# Patient Record
Sex: FEMALE | Race: ASIAN | NOT HISPANIC OR LATINO
[De-identification: names, ages, dates, MRNs, and addresses within clinical notes are randomized per-mention and may not be internally consistent; named-entity substitution may affect disease eponyms.]

---

## 2018-05-08 ENCOUNTER — APPOINTMENT (OUTPATIENT)
Dept: INTERNAL MEDICINE | Facility: CLINIC | Age: 28
End: 2018-05-08
Payer: COMMERCIAL

## 2018-05-08 ENCOUNTER — NON-APPOINTMENT (OUTPATIENT)
Age: 28
End: 2018-05-08

## 2018-05-08 VITALS
WEIGHT: 104 LBS | OXYGEN SATURATION: 98 % | BODY MASS INDEX: 20.42 KG/M2 | HEART RATE: 91 BPM | RESPIRATION RATE: 18 BRPM | TEMPERATURE: 98.2 F | DIASTOLIC BLOOD PRESSURE: 77 MMHG | HEIGHT: 60 IN | SYSTOLIC BLOOD PRESSURE: 111 MMHG

## 2018-05-08 DIAGNOSIS — Z80.0 FAMILY HISTORY OF MALIGNANT NEOPLASM OF DIGESTIVE ORGANS: ICD-10-CM

## 2018-05-08 DIAGNOSIS — N60.02 SOLITARY CYST OF LEFT BREAST: ICD-10-CM

## 2018-05-08 DIAGNOSIS — R00.2 PALPITATIONS: ICD-10-CM

## 2018-05-08 DIAGNOSIS — Z83.3 FAMILY HISTORY OF DIABETES MELLITUS: ICD-10-CM

## 2018-05-08 DIAGNOSIS — Z80.9 FAMILY HISTORY OF MALIGNANT NEOPLASM, UNSPECIFIED: ICD-10-CM

## 2018-05-08 DIAGNOSIS — M79.674 PAIN IN RIGHT TOE(S): ICD-10-CM

## 2018-05-08 DIAGNOSIS — M25.561 PAIN IN RIGHT KNEE: ICD-10-CM

## 2018-05-08 DIAGNOSIS — Z82.49 FAMILY HISTORY OF ISCHEMIC HEART DISEASE AND OTHER DISEASES OF THE CIRCULATORY SYSTEM: ICD-10-CM

## 2018-05-08 DIAGNOSIS — H54.7 UNSPECIFIED VISUAL LOSS: ICD-10-CM

## 2018-05-08 DIAGNOSIS — F43.9 REACTION TO SEVERE STRESS, UNSPECIFIED: ICD-10-CM

## 2018-05-08 DIAGNOSIS — Z00.00 ENCOUNTER FOR GENERAL ADULT MEDICAL EXAMINATION W/OUT ABNORMAL FINDINGS: ICD-10-CM

## 2018-05-08 DIAGNOSIS — R92.2 INCONCLUSIVE MAMMOGRAM: ICD-10-CM

## 2018-05-08 DIAGNOSIS — Z23 ENCOUNTER FOR IMMUNIZATION: ICD-10-CM

## 2018-05-08 PROCEDURE — 93000 ELECTROCARDIOGRAM COMPLETE: CPT

## 2018-05-08 PROCEDURE — 99203 OFFICE O/P NEW LOW 30 MIN: CPT

## 2018-05-08 PROCEDURE — 99385 PREV VISIT NEW AGE 18-39: CPT

## 2018-05-08 RX ORDER — NORETHINDRONE ACETATE AND ETHINYL ESTRADIOL AND FERROUS FUMARATE 1.5-30(21)
KIT ORAL
Refills: 0 | Status: ACTIVE | COMMUNITY

## 2018-05-08 RX ORDER — HUMAN PAPILLOMAVIRUS QUADRIVALENT (TYPES 6, 11, 16, AND 18) VACCINE, RECOMBINANT 20; 40; 40; 20 UG/.5ML; UG/.5ML; UG/.5ML; UG/.5ML
INJECTION, SUSPENSION INTRAMUSCULAR
Qty: 1 | Refills: 0 | Status: ACTIVE | COMMUNITY
Start: 2018-05-08 | End: 1900-01-01

## 2018-07-19 ENCOUNTER — APPOINTMENT (OUTPATIENT)
Dept: CARDIOLOGY | Facility: CLINIC | Age: 28
End: 2018-07-19

## 2019-09-07 ENCOUNTER — EMERGENCY (EMERGENCY)
Facility: HOSPITAL | Age: 29
LOS: 1 days | Discharge: HOME | End: 2019-09-07
Payer: COMMERCIAL

## 2019-09-07 VITALS
OXYGEN SATURATION: 100 % | DIASTOLIC BLOOD PRESSURE: 77 MMHG | RESPIRATION RATE: 18 BRPM | WEIGHT: 104.94 LBS | TEMPERATURE: 98 F | HEART RATE: 112 BPM | SYSTOLIC BLOOD PRESSURE: 107 MMHG | HEIGHT: 68 IN

## 2019-09-07 VITALS
TEMPERATURE: 98 F | RESPIRATION RATE: 16 BRPM | DIASTOLIC BLOOD PRESSURE: 73 MMHG | SYSTOLIC BLOOD PRESSURE: 114 MMHG | HEART RATE: 90 BPM | OXYGEN SATURATION: 100 %

## 2019-09-07 LAB
APPEARANCE UR: CLEAR — SIGNIFICANT CHANGE UP
BACTERIA # UR AUTO: ABNORMAL
BILIRUB UR-MCNC: NEGATIVE — SIGNIFICANT CHANGE UP
COLOR SPEC: YELLOW — SIGNIFICANT CHANGE UP
DIFF PNL FLD: NEGATIVE — SIGNIFICANT CHANGE UP
EPI CELLS # UR: 3 /HPF — SIGNIFICANT CHANGE UP
GLUCOSE UR QL: NEGATIVE — SIGNIFICANT CHANGE UP
HYALINE CASTS # UR AUTO: 1 /LPF — SIGNIFICANT CHANGE UP (ref 0–2)
KETONES UR-MCNC: NEGATIVE — SIGNIFICANT CHANGE UP
LEUKOCYTE ESTERASE UR-ACNC: NEGATIVE — SIGNIFICANT CHANGE UP
NITRITE UR-MCNC: NEGATIVE — SIGNIFICANT CHANGE UP
PH UR: 6.5 — SIGNIFICANT CHANGE UP (ref 5–8)
PROT UR-MCNC: ABNORMAL
RBC CASTS # UR COMP ASSIST: 8 /HPF — HIGH (ref 0–4)
SP GR SPEC: 1.03 — HIGH (ref 1.01–1.02)
UROBILINOGEN FLD QL: NEGATIVE — SIGNIFICANT CHANGE UP
WBC UR QL: 2 /HPF — SIGNIFICANT CHANGE UP (ref 0–5)

## 2019-09-07 PROCEDURE — 81001 URINALYSIS AUTO W/SCOPE: CPT

## 2019-09-07 PROCEDURE — 99284 EMERGENCY DEPT VISIT MOD MDM: CPT

## 2019-09-07 NOTE — ED PROVIDER NOTE - PATIENT PORTAL LINK FT
You can access the FollowMyHealth Patient Portal offered by [facility] by registering at the following website: [email or web address]. By joining [employer]’s FollowMyHealth portal, you will also be able to view your health information using other applications (apps) compatible with our system.

## 2019-09-07 NOTE — ED PROVIDER NOTE - OBJECTIVE STATEMENT
28 yo F no PMHX p/w pelvic pain x 10 days. Pt reports pain has worsened in the past 2-3 days. LMP 2 weeks ago. No N/V. No urinary sx. No hx STDs. Pt with one male sexual partner.

## 2019-09-07 NOTE — ED ADULT NURSE NOTE - OBJECTIVE STATEMENT
29y f pt with significant other c/o pelvic pain x 1 week; LMP 8/21; last vag exam 8/16; 29y f pt with significant other c/o pelvic pain x 10 days; pt indicates RLQ; LMP 8/21; last vag exam 8/16; aox3; no resp distress; no chest pain; no abd pain; no n/v/d; no fever/chills; no cough/congestion; no dysuria/hematuria; no blood in stool; no constipation; pt states passes out when has ultrasound tests and pelvic exams; vss; safety/comfort maintained

## 2019-09-07 NOTE — ED PROVIDER NOTE - PROGRESS NOTE DETAILS
Patrick Hancock M.D. Resident: Pt offered TVUS, declined and would like to follow up with OB on Monday, denies pain at this time, abd S/NT, pelvic exam without adnexal tenderness or cervical motion tenderness

## 2019-09-07 NOTE — ED PROVIDER NOTE - ATTENDING CONTRIBUTION TO CARE
Patient:   EFREN KHANNA            MRN: [id number]            FIN: [id number]              Age:   31 years     Sex:  FEMALE     :  89   Associated Diagnoses:   Vulvar abscess   Author:   ERICK PAREDES     Postoperative Information     Postoperative Information   Postoperative Information:          Preoperative Diagnosis: Vulvar abscess (LDR18-MU N76.4, Diagnosis, Hospitalized, Medical).         Postoperative Diagnosis: Same As Pre-Op Dx.         Performed by:    ERICK PAREDES (Surgeon - Primary)  .         Assistant: None,    .         Surgical Tasks Performed by Assistant: None.         Procedures Performed: 1.  EUA  2.  I & D of vulvar abscess.         Findings: Left vulvar abscess completely drainage with good hemostasis.         Specimens Removed: Left vulvar abscess.  Anerobic and aerobic culture pending..         Estimated Blood Loss: 10  ml.         Blood Administered: No.         Complications: None.         Grafts/Implants: None.    Anesthetic utilized:  Monitored anesthesia care,    SARA VARGAS, RENU WHITE (Supervisor)  .   MD Ponce:  patient seen and evaluated with the resident.  I was present for key portions of the History & Physical, and I agree with the Impression & Plan.  MD Ponce:  30 yo F, c/o 10d R-sided pelvic pain.  Associated Sx: no N/V/D, no F/C, no VB or VD.  Patient is in the ED because she called her primary OB/GYN, who told her to get evaluated in the ED if her pain was that severe.  Patient rates her pain 5/10.  She is not pregnant by ED UCG.   Initial tachycardia of 113 spontaneously resolved w/o any ED intervention.   Physical Exam: adult F, NAD, NCAT, PERRL, EOMI, neck supple, RRR, CTA B, Abd: s/nd/nt (zero TTP, and zero flank pain), Ext: no edema, Neuro: AAOx3, ambulates w/o diff, strength 5/5 & symmetric throughout.  Impression/Plan:  atypical R-sided abdominal pain in otherwise healthy non-pregnant adult F w/o clinical or physical exam evidence of appendicitis or torsion.  There is no abd pain on external exam, she is afebrile, and no GI symptoms after 10d of pain.  Suspicion for torsion low, given that she has no pain on pelvic exam.  While a cyst is possible, she does not has severe pain, she is VERY well appearing, and can safely follow up as an outpatient for further evaluation of this condition.  Strict return precautions (worsening pain, f/c, GI symptoms, vaginal bleeding, &/or vaginal discharge).

## 2019-09-07 NOTE — ED PROVIDER NOTE - PHYSICAL EXAMINATION
Gen: AAOx3, non-toxic  Head: NCAT  HEENT: EOMI, oral mucosa moist, normal conjunctiva  Lung: CTAB, no respiratory distress, no wheezes/rhonchi/rales B/L, speaking in full sentences  CV: RRR, no murmurs, rubs or gallops  Abd: soft, NTND, no guarding  GYN (pelvic exam chaperoned by Vanessa - [facility]) no adnexal tenderness or CM tenderness  MSK: no visible deformities  Neuro: No focal sensory or motor deficits  Skin: Warm, well perfused, no rash  Psych: normal affect.   ~Patrick Hancock M.D. Resident

## 2019-09-07 NOTE — ED PROVIDER NOTE - CLINICAL SUMMARY MEDICAL DECISION MAKING FREE TEXT BOX
pelvic pain x 10 days, exam unremarkable, pt well appearing, no vaginal bleeding, will check upreg, UA, DC with OB/GYN follow up

## 2019-09-07 NOTE — ED ADULT NURSE NOTE - NSIMPLEMENTINTERV_GEN_ALL_ED
Implemented All Universal Safety Interventions:  [place] to call system. Call bell, personal items and telephone within reach. Instruct patient to call for assistance. Room bathroom lighting operational. Non-slip footwear when patient is off stretcher. Physically safe environment: no spills, clutter or unnecessary equipment. Stretcher in lowest position, wheels locked, appropriate side rails in place.

## 2019-09-07 NOTE — ED PROVIDER NOTE - NS ED ROS FT
GENERAL: No fever or chills, EYES: no change in vision, HEENT: no trouble swallowing or speaking, CARDIAC: no chest pain, PULMONARY: no cough or SOB, GI: +abdominal pain, no nausea, no vomiting, no diarrhea or constipation, : No changes in urination, SKIN: no rashes, NEURO: no headache,  MSK: No joint pain ~Patrick Hancock M.D. Resident

## 2019-09-07 NOTE — ED PROVIDER NOTE - NSFOLLOWUPINSTRUCTIONS_ED_ALL_ED_FT
You were seen in the Emergency Department for pelvic pain.  1) Advance activity as tolerated.    2) Continue all previously prescribed medications as directed.  You may take 600 mg Motrin every 6 hours as needed for pain.  3) Follow up with your OB/GYN next week - take copies of your results.    4) Return to the Emergency Department for worsening or persistent symptoms, and/or ANY NEW OR CONCERNING SYMPTOMS. If you have issues obtaining follow up, please call: [phone] to obtain a doctor or specialist who takes your insurance in your area.

## 2019-09-07 NOTE — ED ADULT NURSE NOTE - CADM POA URETHRAL CATHETER
41F with PMHx of dermatomyositis, cryptogenic organizing pneumonia/BOOP with recent admission in December for cough, discharged on MMP, AZA and prednisone. She states by approximately 2 weeks after he discharge her cough had resolved and muscle aches were at her baseline. She was compliant with meds, and followed up with rheumatology 1/29. She had maintained at her baseline, except for redeveloping mild cough productive of clear sputumabout 2 weeks ago, so she was advised to stop her prednisone. Her typical rash on chest and face started to come back 1 week ago, which was associated with a burning sensation, and a few days prior to that she began experiencing progressive muscle aches and weakness. Prior to coming into the hospital her pain was 10/10 diffusely but mostly in shoulders, hips, forearms and hands, and she needed assistance to stand and was unable to raise arms above shoulder level. She denies fevers, chills, change in the color of her urine, dysuria, urinary frequency, hemoptysis, green colored sputu  at about the same time 41F with PMHx of dermatomyositis, cryptogenic organizing pneumonia/BOOP with recent admission in December for cough, discharged on MMP, AZA and prednisone. She states by approximately 2 weeks after he discharge her cough had resolved and muscle aches were at her baseline. She was compliant with meds, and followed up with rheumatology 1/29. She began developing a mild cough 2 weeks, however her muscle aches and weakness were at baseine so she was advised to stop prednisone. Her cough progressed, with paroxysmal coughing ever few hours, and several days later she began developing progressive muscle aches and weakness. Additionally, she redeveloped her typical rash on chest and face, which is associated with a burning sensation and dryness. Prior to coming into the hospital her pain was 10/10 diffusely but mostly in shoulders, hips, forearms and hands, and she needed assistance to stand and was unable to raise arms above shoulder level. She denies fevers, chills, change in the color of her urine, dysuria, urinary frequency, hemoptysis, green colored sputum.      In ED, afebrile, , /80, [place] [address], , ALT 94, Cr at baseline, no leukocytosis, started on Solumedrol 125mg and high rate of NS. No 41F with PMHx of dermatomyositis, cryptogenic organizing pneumonia/BOOP with recent admission in December for cough, discharged on MMP, AZA and prednisone. She states by approximately 2 weeks after he discharge her cough had resolved and muscle aches were at her baseline. She was compliant with meds, and followed up with rheumatology 1/29. She began developing a mild cough 2 weeks, however her muscle aches and weakness were at baseine so she was advised to stop prednisone. Her cough progressed, with paroxysmal coughing ever few hours, and several days later she began developing progressive muscle aches and weakness. Additionally, she redeveloped her typical rash on chest and face, which is associated with a burning sensation and dryness. Prior to coming into the hospital her pain was 10/10 diffusely but mostly in shoulders, hips, forearms and hands, and she needed assistance to stand and was unable to raise arms above shoulder level. She denies fevers, chills, change in the color of her urine, dysuria, urinary frequency, hemoptysis, green colored sputum.      In ED, afebrile, , /60, saturating [place] [address], , ALT 94, Cr at baseline, no leukocytosis, started on Solumedrol 125mg and high rate of NS.

## 2020-02-27 ENCOUNTER — RESULT REVIEW (OUTPATIENT)
Age: 30
End: 2020-02-27

## 2020-09-20 ENCOUNTER — EMERGENCY (EMERGENCY)
Facility: HOSPITAL | Age: 30
LOS: 1 days | Discharge: HOME | End: 2020-09-20
Attending: EMERGENCY MEDICINE
Payer: COMMERCIAL

## 2020-09-20 VITALS
TEMPERATURE: 98 F | WEIGHT: 104.06 LBS | HEART RATE: 93 BPM | RESPIRATION RATE: 16 BRPM | SYSTOLIC BLOOD PRESSURE: 127 MMHG | HEIGHT: 60 IN | DIASTOLIC BLOOD PRESSURE: 73 MMHG | OXYGEN SATURATION: 99 %

## 2020-09-20 PROCEDURE — 99283 EMERGENCY DEPT VISIT LOW MDM: CPT | Mod: 25

## 2020-09-20 PROCEDURE — 99283 EMERGENCY DEPT VISIT LOW MDM: CPT

## 2020-09-20 PROCEDURE — 73060 X-RAY EXAM OF HUMERUS: CPT

## 2020-09-20 PROCEDURE — 73060 X-RAY EXAM OF HUMERUS: CPT | Mod: 26,LT

## 2020-09-20 RX ORDER — IBUPROFEN 200 MG
600 TABLET ORAL ONCE
Refills: 0 | Status: COMPLETED | OUTPATIENT
Start: 2020-09-20 | End: 2020-09-20

## 2020-09-20 RX ADMIN — Medication 600 MILLIGRAM(S): at 19:54
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
## 2020-09-20 NOTE — ED PROVIDER NOTE - SOCIAL CONCERNS
Takes metformin 500 mg BID and insulin glargine 25 units HS at home. Hemoglobin A1c 15%. Giving insulin detemir 15 units am 25 units HS, insulin aspart 8 units TID with meals, moderate dose insulin sliding scale.      None

## 2020-09-20 NOTE — ED PROVIDER NOTE - PATIENT PORTAL LINK FT
You can access the FollowMyHealth Patient Portal offered by [facility] by registering at the following website: [email or web address]. By joining [employer]’s FollowMyHealth portal, you will also be able to view your health information using other applications (apps) compatible with our system.

## 2020-09-20 NOTE — ED PROVIDER NOTE - NEURO NEGATIVE STATEMENT, MLM
no loss of consciousness, no gait abnormality, no headache, no sensory deficits, and no weakness. no
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
## 2020-09-21 PROBLEM — Z78.9 OTHER SPECIFIED HEALTH STATUS: Chronic | Status: ACTIVE | Noted: 2019-09-07

## 2021-05-22 ENCOUNTER — EMERGENCY (EMERGENCY)
Facility: HOSPITAL | Age: 31
LOS: 1 days | Discharge: HOME | End: 2021-05-22
Attending: STUDENT IN AN ORGANIZED HEALTH CARE EDUCATION/TRAINING PROGRAM
Payer: COMMERCIAL

## 2021-05-22 VITALS
SYSTOLIC BLOOD PRESSURE: 100 MMHG | OXYGEN SATURATION: 98 % | DIASTOLIC BLOOD PRESSURE: 64 MMHG | HEART RATE: 89 BPM | TEMPERATURE: 99 F | RESPIRATION RATE: 16 BRPM

## 2021-05-22 VITALS
RESPIRATION RATE: 20 BRPM | OXYGEN SATURATION: 97 % | TEMPERATURE: 98 F | HEIGHT: 60 IN | HEART RATE: 136 BPM | WEIGHT: 104.94 LBS | DIASTOLIC BLOOD PRESSURE: 78 MMHG | SYSTOLIC BLOOD PRESSURE: 107 MMHG

## 2021-05-22 PROBLEM — Z78.9 OTHER SPECIFIED HEALTH STATUS: Chronic | Status: ACTIVE | Noted: 2020-09-20

## 2021-05-22 PROCEDURE — 99284 EMERGENCY DEPT VISIT MOD MDM: CPT

## 2021-05-22 PROCEDURE — 99283 EMERGENCY DEPT VISIT LOW MDM: CPT

## 2021-05-22 RX ORDER — FAMOTIDINE 10 MG/ML
20 INJECTION INTRAVENOUS ONCE
Refills: 0 | Status: COMPLETED | OUTPATIENT
Start: 2021-05-22 | End: 2021-05-22

## 2021-05-22 RX ORDER — FAMOTIDINE 10 MG/ML
1 INJECTION INTRAVENOUS
Qty: 8 | Refills: 0
Start: 2021-05-22 | End: 2021-05-25

## 2021-05-22 RX ORDER — FAMOTIDINE 10 MG/ML
20 INJECTION INTRAVENOUS DAILY
Refills: 0 | Status: DISCONTINUED | OUTPATIENT
Start: 2021-05-22 | End: 2021-05-22

## 2021-05-22 RX ORDER — DIPHENHYDRAMINE HCL 50 MG
25 CAPSULE ORAL ONCE
Refills: 0 | Status: COMPLETED | OUTPATIENT
Start: 2021-05-22 | End: 2021-05-22

## 2021-05-22 RX ORDER — EPINEPHRINE 0.3 MG/.3ML
0.3 INJECTION INTRAMUSCULAR; SUBCUTANEOUS
Qty: 1 | Refills: 0
Start: 2021-05-22

## 2021-05-22 RX ORDER — DIPHENHYDRAMINE HCL 50 MG
1 CAPSULE ORAL
Qty: 20 | Refills: 0
Start: 2021-05-22

## 2021-05-22 RX ORDER — DEXAMETHASONE 0.5 MG/5ML
12 ELIXIR ORAL ONCE
Refills: 0 | Status: COMPLETED | OUTPATIENT
Start: 2021-05-22 | End: 2021-05-22

## 2021-05-22 RX ADMIN — Medication 25 MILLIGRAM(S): at 06:09

## 2021-05-22 RX ADMIN — Medication 12 MILLIGRAM(S): at 06:08

## 2021-05-22 RX ADMIN — FAMOTIDINE 20 MILLIGRAM(S): 10 INJECTION INTRAVENOUS at 06:31

## 2021-05-22 NOTE — ED PROVIDER NOTE - CLINICAL SUMMARY MEDICAL DECISION MAKING FREE TEXT BOX
Pt p/w diffuse rash with COVID vaccine today. Some symptoms concerning for anaphylaxis that have resolved PTA to the ED and exam not concerning for anaphylaxis nor airway swelling. Will give benadryl, dex, and famotidine and observe in the ED for 4 hours. Will sign out to incoming doc pending completion of observation. Rx sent for epipen. Pt stable.

## 2021-05-22 NOTE — ED ADULT NURSE NOTE - OBJECTIVE STATEMENT
Patient presented to the ED with c/o allergic reaction to the 2nd dose of Pfizer vaccine. Patient presented with c/o generalized rash and swelling to face. Denies any difficulty breathing/swallowing.

## 2021-05-22 NOTE — ED PROVIDER NOTE - PATIENT PORTAL LINK FT
You can access the FollowMyHealth Patient Portal offered by [facility] by registering at the following website: [email or web address]. By joining [employer]’s FollowMyHealth portal, you will also be able to view your health information using other applications (apps) compatible with our system.

## 2021-05-22 NOTE — ED PROVIDER NOTE - PROGRESS NOTE DETAILS
WALLY: Patient signed out to me by Dr. Welch with plan to observe for total of 4 hours. Patient is resting comfortably, NAD.

## 2021-05-22 NOTE — ED PROVIDER NOTE - NSFOLLOWUPINSTRUCTIONS_ED_ALL_ED_FT
Allergies    WHAT YOU NEED TO KNOW:    Allergies are an immune system reaction to a substance called an allergen. Your immune system sees the allergen as harmful and attacks it. An allergic reaction can be mild or life-threatening. A life-threatening reaction is called anaphylaxis. Anaphylaxis is a sudden, life-threatening reaction that needs immediate treatment.    DISCHARGE INSTRUCTIONS:    Call 911 for signs or symptoms of anaphylaxis, such as trouble breathing, swelling in your mouth or throat, or wheezing. You may also have itching, a rash, hives, or feel like you are going to faint.    Return to the emergency department if:   •You have tingling in your hands or feet.       •Your skin is red or flushed.       Contact your healthcare provider if:   •You have questions or concerns about your condition or care.          Medicines:   •Antihistamines help decrease itching, sneezing, and swelling. You may take them as a pill or use drops in your nose or eyes.       •Decongestants help your nose feel less stuffy.       •Topical treatments help decrease itching or swelling. You also may be given nasal sprays or eyedrops.       •Epinephrine is used to treat a severe allergic reaction such as anaphylaxis.       •Take your medicine as directed. Contact your healthcare provider if you think your medicine is not helping or if you have side effects. Tell him of her if you are allergic to any medicine. Keep a list of the medicines, vitamins, and herbs you take. Include the amounts, and when and why you take them. Bring the list or the pill bottles to follow-up visits. Carry your medicine list with you in case of an emergency.      Steps to take for signs or symptoms of anaphylaxis:   •Immediately give 1 shot of epinephrine only into the outer thigh muscle.       •Leave the shot in place as directed. Your healthcare provider may recommend you leave it in place for up to 10 seconds before you remove it. This helps make sure all of the epinephrine is delivered.       •Call 911 and go to the emergency department, even if the shot improved symptoms. Do not drive yourself. Bring the used epinephrine shot with you.       Safety precautions to take if you are at risk for anaphylaxis:   •Keep 2 shots of epinephrine with you at all times. You may need a second shot, because epinephrine only works for about 20 minutes and symptoms may return. Your healthcare provider can show you and family members how to give the shot. Check the expiration date every month and replace it before it expires.      •Create an action plan. Your healthcare provider can help you create a written plan that explains the allergy and an emergency plan to treat a reaction. The plan explains when to give a second epinephrine shot if symptoms return or do not improve after the first. Give copies of the action plan and emergency instructions to family members and work staff. Show them how to give a shot of epinephrine.      •Be careful when you exercise. If you have had exercise-induced anaphylaxis, do not exercise right after you eat. Stop exercising right away if you start to develop any signs or symptoms of anaphylaxis. You may first feel tired, warm, or have itchy skin. Hives, swelling, and severe breathing problems may develop if you continue to exercise.      •Carry medical alert identification. Wear medical alert jewelry or carry a card that explains the allergy. Ask your healthcare provider where to get these items.   Medical Alert Jewelry           •Inform all healthcare providers of the allergy. This includes dentists, nurses, doctors, and surgeons.       Manage allergies:   •Use nasal rinses as directed. Rinse with a saline solution daily. This will help clear allergens out of your nose. Use distilled water if possible. You can also boil tap water and let it cool before you use it. Do not use tap water that has not been boiled.      •Do not smoke. Allergy symptoms may decrease if you are not around smoke. Nicotine and other chemicals in cigarettes and cigars can cause lung damage. Ask your healthcare provider for information if you currently smoke and need help to quit. E-cigarettes or smokeless tobacco still contain nicotine. Talk to your healthcare provider before you use these products.       Prevent allergic reactions:   •Do not go outside when pollen counts are high if you have seasonal allergies. Your symptoms may be better if you go outside only in the morning or evening. Use your air conditioner, and change air filters often.       •Avoid dust, fur, and mold. Dust and vacuum your home often. You may want to wear a mask when you vacuum. Keep pets in certain rooms, and bathe them often. Use a dehumidifier (machine that decreases moisture) to help prevent mold.       •Do not use products that contain latex if you have a latex allergy. Use nonlatex gloves if you work in healthcare or in food preparation. Always tell healthcare providers about a latex allergy.       •Avoid areas that attract insects if you have an insect bite or sting allergy. Areas include trash cans, gardens, and picnics. Do not wear bright clothing or strong scents when you will be outside.      •Prevent an allergic reaction caused by food. You may have a reaction if your food is not prepared safely. For example, you could be served food that touched your trigger food during preparation. This is called cross-contamination. Kitchen tools can also cause cross-contamination. You may also eat baked foods that contain a trigger food you do not know about. Ask if the food contains your trigger food before you handle or eat it.      Follow up with your healthcare provider as directed: Write down your questions so you remember to ask them during your visits. When you have an allergic reaction, write down everything you were exposed to in the 2 hours before the reaction. Take that information to your next visit.       © Copyright [employer] 2021           back to top                          © Copyright [employer] 2021

## 2021-05-22 NOTE — ED PROVIDER NOTE - OBJECTIVE STATEMENT
32 yo F h/o environmental allergies, no anaphylaxis, endometriosis p/w diffuse hives involving bilat arms and legs, torso, and back after receiving the second dose of pfizer covid vaccine. Pt states that she frequently gets hives 2/2 a number of allergies including pollen/dust, heat, and other environmental allergies and at 4pm had mild amount of hives on bilat forearms but was feeling well. Pt then received vaccine at 6pm and then shortly thereafter developed this diffuse rash. Pt states that she also felt nauseous but did not vomit and felt that her throat was irritated but had no SOB/wheezing/vomiting/ABD pain. Pt took 25 mg of benadryl with some improvement in the rash and then came to the ER. Pt denies recent fever, dysuria or frequency/hesitancy, chest pain, focal numbness/weakness, syncope, other recent illness or hospitalizations.

## 2021-05-22 NOTE — ED PROVIDER NOTE - NS ED ROS FT
Patient Reports No  Fever/Chills  Vomiting/Diarrhea  Urinary Symptoms  Chest Pain, Shortness of Breath  Syncope, Focal Numbness or Weakness  Other Recent Illness or Hospitalizations

## 2021-05-22 NOTE — ED ADULT TRIAGE NOTE - CHIEF COMPLAINT QUOTE
4pm yesterday started getting rash on my arms ,6pm,got my 2nd dose of pfeizer covid vaccine  and the rash got worse  took benadryl at 11pm, 3am I woke with face swelling, no sob

## 2021-09-17 NOTE — ED PROVIDER NOTE - PHYSICAL EXAMINATION
Vital Signs Reviewed  GEN: Comfortable, NAD, AAOx3  HEENT: Nml tongue and lips, Nml oropharynx, NCAT, MMM, Neck Supple  RESP: CTAB, No rales/rhonchi/wheezing  CV: RRR, S1S2, No murmurs  ABD: No TTP, Soft, ND, No masses, No CVA Tenderness  Extrem/Skin: Diffuse erythema with few raised lesions on bilat arms and legs, torso, and back, Equal pulses bilat, No cyanosis/edema  Neuro: No focal deficits No

## 2021-10-28 NOTE — ED ADULT NURSE NOTE - NSFALLRSKOUTCOME_ED_ALL_ED
83 yo F PMHX HTN, Glaucoma, HLD presented with one day history of SOB and wheezing.  Patient states she was in usual state of health until 10/26, when she started to feel wheezing after breakfast. She made her  breakfast and then played bridge from 1-3, but after bridge game, she could not catch her breath. Came to ED for further evaluation. Universal Safety Interventions

## 2022-05-03 NOTE — ED PROVIDER NOTE - PSYCHIATRIC LEVEL OF CONSCIOUSNESS
Discharge planning issues Discharge planning issues Discharge planning issues Discharge planning issues alert

## 2022-05-09 NOTE — ED ADULT NURSE NOTE - LATERALITY
----- Message from Bel Plummer MD sent at 5/9/2022  1:02 PM EDT -----  Cholesterol and blood test are good. left

## 2022-11-30 NOTE — ED ADULT NURSE NOTE - CAS DISCH TRANSFER METHOD
December 8, 2022     Philip Regalado, [address] [facility],[address]  [address] [place] [address]    Patient: Lanre Chandra   YOB: 1952   Date of Visit: 11/30/2022       Dear Dr Bernabe Salcedo: Thank you for referring Pamela Taylor to me for evaluation  Below are my notes for this consultation  If you have questions, please do not hesitate to call me  I look forward to following your patient along with you  Sincerely,        Garret Valle MD        CC: Kennedi Duran, MD Allen Felton, MD Garret Guerrero MD  12/8/2022 12:39 AM  Sign when Signing Visit  [address]  [phone]  [phone]     Date of Visit: 11/30/2022  Name: Lanre Chandra   YOB: 1952        Subjective    VISIT DIAGNOSIS:  Diagnoses and all orders for this visit:    Malignant melanoma of leg, right ([facility] [address] )  -     CBC and differential; Future  -     Comprehensive metabolic panel; Future  -     LD,Blood; Future  -     ECG 12 lead; Future    High risk medication use  -     CBC and differential; Future  -     Comprehensive metabolic panel; Future  -     LD,Blood; Future  -     ECG 12 lead; Future        Oncology History   Malignant melanoma of leg, right ([facility] [address] )   5/31/2022 Biopsy    Right Leg, Shave Biopsy   Melanoma extending to the deep specimen margin  Thickness: 7 0mm  Ulceration: not seen   Mitoses: 3      5/31/2022 -  Cancer Staged    Staging form: Melanoma of the Skin, AJCC 8th Edition  - Clinical stage from 5/31/2022: Stage IIB (cT4a, cN0, cM0) - Signed by Garret Valle MD on 8/25/2022 7/1/2022 Initial Diagnosis    Malignant melanoma of leg, right ([facility] [address] )     7/29/2022 Surgery    A  Lymph node, sentinel, #1:  One lymph node with rare metastatic melanoma cells (1/1), subcapsular location  Immunohistochemical study for MART-1, HMB45 and S100 supports the findings           B  Leg, right, knee, wide excision:  Residual melanoma, nodular type, and scar; margins free  See synoptic report  7/29/2022 -  Cancer Staged    Staging form: Melanoma of the Skin, AJCC 8th Edition  - Pathologic stage from 7/29/2022: Stage IIIC (pT4a, pN1a, cM0) - Signed by Tawana Renee MD on 8/25/2022          Cancer Staging   Malignant melanoma of leg, right [facility])  Staging form: Melanoma of the Skin, AJCC 8th Edition  - Clinical stage from 5/31/2022: Stage IIB (cT4a, cN0, cM0) - Signed by Tawana Renee MD on 8/25/2022  - Pathologic stage from 7/29/2022: Stage IIIC (pT4a, pN1a, cM0) - Signed by Tawana Renee MD on 8/25/2022     Treatment Details   Treatment goal [No plan goal]   Plan Name ONE-TIME BLOOD PRODUCT TRANSFUSION PLAN   Status Active   Start Date 7/15/2022   End Date Until discontinued   Provider VISHNU Stahl   Chemotherapy [No matching medication found in this treatment plan]     Treatment Details   Treatment goal Palliative   Plan Name OP RiTUXimab weekly x 4, every 6 months   Status Active   Start Date 8/1/2022   End Date 8/23/2022   Provider Lawson Zuniga DO   Chemotherapy riTUXimab (RITUXAN) subsequent titrated chemo infusion, 375 mg/m2 = 746 2 mg, Intravenous, Once, 1 of 1 cycle  Administration: 746 2 mg (8/9/2022), 746 2 mg (8/16/2022), 746 2 mg (8/23/2022)    riTUXimab (RITUXAN) first titrated chemo infusion, 375 mg/m2 = 746 2 mg, Intravenous, Once, 1 of 1 cycle  Administration: 746 2 mg (8/1/2022)          HISTORY OF PRESENT ILLNESS: Ascencion Shaw is a 78 y o  male  who is a history of autoimmune hemolytic anemia requiring treatment with stage IIIc 4 after initiating treatment with adjuvant encorafenib and binimetinib  Started treatment on 11/15/2022  He did call in on 11/17/2022 stating that he developed nausea, dizziness and headaches  He did state the dizziness was constant making it hard to get up and down the steps  He has been eating while taking his medications    We did send him in nausea medication  He has been doing better on the medication and has no complaints in the office today  He does state he can handle the way he feels  Dizziness has subsided  Denies any additional side effects associated with targeted therapy  He denies rash, itching, migratory arthralgias, swelling, chest pain, shortness of breath, and diarrhea  Get an EKG which was unchanged from prior  No significant changes in QTC  REVIEW OF SYSTEMS:  Review of Systems   Constitutional: Negative for appetite change, fatigue, fever and unexpected weight change  HENT:   Negative for lump/mass  Eyes: Negative for icterus  Respiratory: Negative for cough, shortness of breath and wheezing  Cardiovascular: Negative for leg swelling  Gastrointestinal: Positive for nausea  Negative for abdominal pain, constipation, diarrhea and vomiting  Genitourinary: Negative for difficulty urinating and hematuria  Musculoskeletal: Negative for arthralgias, gait problem and myalgias  Skin: Negative for itching and rash  No new, changing, or concerning lesions  Neurological: Positive for dizziness (resolved) and headaches (resolved)  Negative for extremity weakness, gait problem, light-headedness and numbness  Hematological: Negative for adenopathy          MEDICATIONS:    Current Outpatient Medications:   •  acetaminophen (TYLENOL) 325 mg tablet, Take 2 tablets (650 mg total) by mouth every 6 (six) hours as needed for mild pain, Disp:  , Rfl: 0  •  ALPRAZolam (XANAX) 0 5 mg tablet, Take 1 tablet (0 5 mg total) by mouth 2 (two) times a day Take one two hours before scan and second one 30 min before, Disp: 2 tablet, Rfl: 0  •  benzonatate (TESSALON PERLES) 100 mg capsule, Take 1 capsule (100 mg total) by mouth 3 (three) times a day, Disp: 20 capsule, Rfl: 0  •  binimetinib (MEKTOVI) 15 MG tablet, Take 3 tablets (45 mg total) by mouth every 12 (twelve) hours, Disp: 180 tablet, Rfl: 5  • dabigatran etexilate (PRADAXA) 150 mg capsu, Take 1 capsule (150 mg total) by mouth every 12 (twelve) hours, Disp: 60 capsule, Rfl: 0  •  encorafenib (BRAFTOVI) 75 MG capsule, Take 6 capsules (450 mg total) by mouth daily, Disp: 180 capsule, Rfl: 5  •  furosemide (LASIX) 20 mg tablet, Take 1 tablet (20 mg total) by mouth daily, Disp: 5 tablet, Rfl: 0  •  hydrochlorothiazide (HYDRODIURIL) 25 mg tablet, Take 1 tablet (25 mg total) by mouth daily (Patient taking differently: Take 25 mg by mouth every morning), Disp: 30 tablet, Rfl: 5  •  metoprolol succinate (TOPROL-XL) 25 mg 24 hr tablet, Take 0 5 tablets (12 5 mg total) by mouth daily (Patient taking differently: Take 12 5 mg by mouth every morning), Disp: 30 tablet, Rfl: 5  •  ondansetron (ZOFRAN) 4 mg tablet, Take 1 tablet (4 mg total) by mouth every 8 (eight) hours as needed for nausea or vomiting, Disp: 20 tablet, Rfl: 0  •  pantoprazole (PROTONIX) 40 mg tablet, Take 1 tablet (40 mg total) by mouth daily (Patient taking differently: Take 40 mg by mouth every morning), Disp: 90 tablet, Rfl: 3  •  potassium chloride (K-DUR,KLOR-CON) 20 mEq tablet, Take 1 tablet (20 mEq total) by mouth daily (Patient taking differently: Take 20 mEq by mouth every morning), Disp: 30 tablet, Rfl: 3  •  prazosin (MINIPRESS) 1 mg capsule, Take 1 mg by mouth daily at bedtime , Disp: , Rfl:   •  predniSONE 20 mg tablet, Take 2 tablets (40 mg total) by mouth daily, Disp: 60 tablet, Rfl: 0  •  sodium chloride, PF, 0 9 %, 10 mL by Intracatheter route daily Intracatheter flushing daily   May substitute prefilled syringe with normal saline 10 mL vials, 10 mL syringes, and 18 g blunt needles, Disp: 300 mL, Rfl: 0  •  sulfamethoxazole-trimethoprim (BACTRIM DS) 800-160 mg per tablet, Take 1 tablet by mouth 3 (three) times a week Monday, Wednesday and Friday, Disp: 12 tablet, Rfl: 0  •  VITAMIN D PO, Take 1,000 Units by mouth daily , Disp: , Rfl:   •  ascorbic acid (VITAMIN C) 1000 MG tablet, Take 1 tablet (1,000 mg total) by mouth every 12 (twelve) hours for 6 doses (Patient taking differently: Take 1,000 mg by mouth daily Every other day), Disp: 6 tablet, Rfl: 0     ALLERGIES:  No Known Allergies     ACTIVE PROBLEMS:  Patient Active Problem List   Diagnosis   • 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   • Hyperbilirubinemia   • Symptomatic anemia   • Pulmonary embolism with acute cor pulmonale (HCC)   • Portal vein thrombosis   • Elevated blood pressure reading without diagnosis of hypertension   • Shortness of breath   • Gastroesophageal reflux disease   • Autoimmune hemolytic anemia   • ARABELLA (acute kidney injury) ([facility] [address] )   • Splenomegaly   • Iron overload due to repeated red blood cell transfusions   • Posttraumatic stress disorder   • Essential hypertension   • Glucose intolerance (impaired glucose tolerance)   • Renal insufficiency   • Auto immune neutropenia (HCC)   • Primary osteoarthritis of left knee   • Pain in joint, lower leg   • Pain in left knee   • COVID-19   • Thrombocytosis   • Primary localized osteoarthrosis of the knee, right   • Hyperglycemia   • Chronic bilateral low back pain with bilateral sciatica   • Hypercholesterolemia   • Malignant melanoma of leg, right (HCC)   • Dyspnea   • Acute respiratory failure with hypoxia (HCC)   • Elevated serum creatinine   • Elevated bilirubin   • Leukocytosis   • Lymphocele after surgical procedure          PAST MEDICAL HISTORY:   Past Medical History:   Diagnosis Date   • Anemia 11/2/2016   • Anxiety    • Arthritis    • Autoimmune hemolytic anemia ([facility] )    • Claustrophobia    • COVID 12/2020   • DVT (deep venous thrombosis) (HCC)    • GERD (gastroesophageal reflux disease)    • Hearing loss, right    • Hemolytic anemia (HCC)    • History of transfusion     2018 - no adverse reaction   • Hypertension    • Malignant melanoma of leg, right ([facility] ) 7/1/2022   • Palpitation    • Portal vein thrombosis    • PTSD (post-traumatic stress disorder)    • Pulmonary emboli ([facility] [address] )    • Tobacco abuse         PAST SURGICAL HISTORY:  Past Surgical History:   Procedure Laterality Date   • CATARACT EXTRACTION Bilateral    • CHOLECYSTECTOMY  07/18/2017   • COLONOSCOPY     • ELBOW ARTHROPLASTY Left     bursectomy   • IR DRAINAGE TUBE CHECK WITH SCLEROSIS  10/06/2022   • IR DRAINAGE TUBE CHECK WITH SCLEROSIS  10/10/2022   • IR DRAINAGE TUBE CHECK WITH SCLEROSIS  10/20/2022   • IR DRAINAGE TUBE CHECK WITH SCLEROSIS  10/27/2022   • IR DRAINAGE TUBE CHECK WITH SCLEROSIS  11/04/2022   • IR DRAINAGE TUBE CHECK WITH SCLEROSIS  11/15/2022   • IR DRAINAGE TUBE CHECK WITH SCLEROSIS  11/25/2022   • IR DRAINAGE TUBE PLACEMENT  09/19/2022   • JOINT REPLACEMENT Right 02/02/2021    knee   • KNEE SURGERY Right     meniscus tear   • LYMPH NODE BIOPSY Right 07/29/2022    Procedure: BIOPSY LYMPH NODE SENTINEL;  Surgeon: Lluvia Barnard MD;  [facility];  Service: Surgical Oncology   • MS LAP,CHOLECYSTECTOMY/GRAPH N/A 12/23/2017    Procedure: CHOLECYSTECTOMY LAPAROSCOPIC with cholangiogram;  Surgeon: Nelly Carrillo MD;  Location[facility];  Service: General   • MS REMOVAL SPLEEN, TOTAL N/A 05/18/2017    Procedure: LAPAROSCOPIC HAND ASSIST SPLENECTOMY;  Surgeon: Rafael Fleischer, MD;  Location[facility];  Service: Surgical Oncology   • MS TOTAL KNEE ARTHROPLASTY Right 02/02/2021    Procedure: ARTHROPLASTY KNEE TOTAL;  Surgeon: Stephanie Spencer MD;  [facility];  Service: Orthopedics   • MS TOTAL KNEE ARTHROPLASTY Left 11/17/2021    Procedure: TOTAL KNEE REPLACEMENT;  Surgeon: Stephanie Spencer MD;  [facility];  Service: Orthopedics   • SHOULDER SURGERY Left     rotator cuff x4, reconstruction   • SKIN BIOPSY  5 May 2022   • SKIN CANCER EXCISION  29 July 2022   • SKIN LESION EXCISION Right 07/29/2022    Procedure: WIDE EXCISION RIGHT MEDIAL THIGH;  Surgeon: Lluvia Barnard MD;  [facility]: [facility];  Service: Surgical Oncology        SOCIAL HISTORY:  Social History Socioeconomic History   • Marital status: /Civil Union     Spouse name: None   • Number of children: None   • Years of education: None   • Highest education level: None   Occupational History   • None   Tobacco Use   • Smoking status: Some Days     Packs/day: 0 00     Years: 5 00     Pack years: 0 00     Types: Cigars, Cigarettes   • Smokeless tobacco: Current     Types: Snuff   • Tobacco comments:     5  a week average   Vaping Use   • Vaping Use: Never used   Substance and Sexual Activity   • Alcohol use: Yes     Alcohol/week: 6 0 standard drinks     Types: 6 Cans of beer per week     Comment: socially   • Drug use: Yes     Types: Marijuana     Comment: medical marijuana   • Sexual activity: Yes     Partners: Female     Birth control/protection: None   Other Topics Concern   • None   Social History Narrative    Daily coffee consumption (2 cups/day)    reitred     Social Determinants of Health     Financial Resource Strain: Not on file   Food Insecurity: No Food Insecurity   • Worried About Running Out of Food in the Last Year: Never true   • Ran Out of Food in the Last Year: Never true   Transportation Needs: No Transportation Needs   • Lack of Transportation (Medical): No   • Lack of Transportation (Non-Medical): No   Physical Activity: Not on file   Stress: Not on file   Social Connections: Not on file   Intimate Partner Violence: Not on file   Housing Stability: Low Risk    • Unable to Pay for Housing in the Last Year: No   • Number of Places Lived in the Last Year: 1   • Unstable Housing in the Last Year: No        FAMILY HISTORY:  Family History   Problem Relation Age of Onset   • Cancer Mother    • Breast cancer Mother    • Other Father         CABG   • Heart attack Paternal Grandfather         acute MI           Objective    PHYSICAL EXAMINATION:   Blood pressure 130/70, pulse 93, temperature 98 °F (36 7 °C), resp  rate 16, height 5' 7" (1 702 m), weight 88 kg (194 lb), SpO2 98 %       Pain Score: 0-No pain     ECOG Performance Status    Flowsheet Row Most Recent Value   ECOG Performance Status 0 - Fully active, able to carry on all pre-disease performance without restriction             Physical Exam  Constitutional:       General: He is not in acute distress  Appearance: Normal appearance  He is not toxic-appearing  HENT:      Mouth/Throat:      Mouth: Mucous membranes are moist       Pharynx: Oropharynx is clear  Eyes:      General: No scleral icterus  Cardiovascular:      Rate and Rhythm: Normal rate and regular rhythm  Pulses: Normal pulses  Heart sounds: No murmur heard  No friction rub  No gallop  Pulmonary:      Effort: Pulmonary effort is normal  No respiratory distress  Breath sounds: Normal breath sounds  No wheezing or rales  Abdominal:      General: There is no distension  Palpations: There is no mass  Tenderness: There is no abdominal tenderness  There is no rebound  Musculoskeletal:         General: No swelling or tenderness  Right lower leg: No edema  Left lower leg: No edema  Lymphadenopathy:      Head:      Right side of head: No submandibular, preauricular or posterior auricular adenopathy  Left side of head: No submandibular, preauricular or posterior auricular adenopathy  Cervical: No cervical adenopathy  Right cervical: No superficial or posterior cervical adenopathy  Left cervical: No superficial or posterior cervical adenopathy  Upper Body:      Right upper body: No supraclavicular or axillary adenopathy  Left upper body: No supraclavicular or axillary adenopathy  Skin:     Findings: No rash  Comments: No concerning skin lesions  Neurological:      General: No focal deficit present  Mental Status: He is alert and oriented to person, place, and time  Psychiatric:         Mood and Affect: Mood normal          Behavior: Behavior normal          Thought Content:  Thought content normal  Judgment: Judgment normal          I reviewed lab data in the chart      WBC   Date Value Ref Range Status   11/28/2022 12 42 (H) 4 31 - 10 16 Thousand/uL Final   11/11/2022 12 33 (H) 4 31 - 10 16 Thousand/uL Final   11/03/2022 14 07 (H) 4 31 - 10 16 Thousand/uL Final     Hemoglobin   Date Value Ref Range Status   11/28/2022 14 8 12 0 - 17 0 g/dL Final   11/11/2022 14 7 12 0 - 17 0 g/dL Final   11/03/2022 15 0 12 0 - 17 0 g/dL Final     Platelets   Date Value Ref Range Status   11/28/2022 535 (H) 149 - 390 Thousands/uL Final   11/11/2022 433 (H) 149 - 390 Thousands/uL Final   11/03/2022 439 (H) 149 - 390 Thousands/uL Final     MCV   Date Value Ref Range Status   11/28/2022 108 (H) 82 - 98 fL Final   11/11/2022 109 (H) 82 - 98 fL Final   11/03/2022 109 (H) 82 - 98 fL Final      Potassium   Date Value Ref Range Status   11/28/2022 3 6 3 5 - 5 3 mmol/L Final   10/21/2022 3 0 (L) 3 5 - 5 3 mmol/L Final   10/05/2022 4 1 3 5 - 5 3 mmol/L Final     Chloride   Date Value Ref Range Status   11/28/2022 106 96 - 108 mmol/L Final   10/21/2022 108 96 - 108 mmol/L Final   10/05/2022 105 96 - 108 mmol/L Final     CO2   Date Value Ref Range Status   11/28/2022 28 21 - 32 mmol/L Final   10/21/2022 25 21 - 32 mmol/L Final   10/05/2022 30 21 - 32 mmol/L Final     CO2, i-STAT   Date Value Ref Range Status   05/18/2017 26 21 - 32 mmol/L Final     BUN   Date Value Ref Range Status   11/28/2022 22 5 - 25 mg/dL Final   10/21/2022 23 5 - 25 mg/dL Final   10/05/2022 24 5 - 25 mg/dL Final     Creatinine   Date Value Ref Range Status   11/28/2022 1 44 (H) 0 60 - 1 30 mg/dL Final     Comment:     Standardized to IDMS reference method   10/21/2022 1 16 0 60 - 1 30 mg/dL Final     Comment:     Standardized to IDMS reference method   10/05/2022 1 20 0 60 - 1 30 mg/dL Final     Comment:     Standardized to IDMS reference method     Glucose   Date Value Ref Range Status   10/05/2022 88 65 - 140 mg/dL Final     Comment:     If the patient is fasting, the ADA then defines impaired fasting glucose as > 100 mg/dL and diabetes as > or equal to 123 mg/dL  Specimen collection should occur prior to Sulfasalazine administration due to the potential for falsely depressed results  Specimen collection should occur prior to Sulfapyridine administration due to the potential for falsely elevated results  09/07/2022 134 65 - 140 mg/dL Final     Comment:     If the patient is fasting, the ADA then defines impaired fasting glucose as > 100 mg/dL and diabetes as > or equal to 123 mg/dL  Specimen collection should occur prior to Sulfasalazine administration due to the potential for falsely depressed results  Specimen collection should occur prior to Sulfapyridine administration due to the potential for falsely elevated results  08/30/2022 126 65 - 140 mg/dL Final     Comment:     If the patient is fasting, the ADA then defines impaired fasting glucose as > 100 mg/dL and diabetes as > or equal to 123 mg/dL  Specimen collection should occur prior to Sulfasalazine administration due to the potential for falsely depressed results  Specimen collection should occur prior to Sulfapyridine administration due to the potential for falsely elevated results  Calcium   Date Value Ref Range Status   11/28/2022 9 7 8 3 - 10 1 mg/dL Final   10/21/2022 9 5 8 3 - 10 1 mg/dL Final   10/05/2022 9 5 8 3 - 10 1 mg/dL Final     Albumin   Date Value Ref Range Status   11/28/2022 3 3 (L) 3 5 - 5 0 g/dL Final   10/21/2022 3 2 (L) 3 5 - 5 0 g/dL Final   10/05/2022 3 6 3 5 - 5 0 g/dL Final     Total Bilirubin   Date Value Ref Range Status   11/28/2022 0 56 0 20 - 1 00 mg/dL Final     Comment:     Use of this assay is not recommended for patients undergoing treatment with eltrombopag due to the potential for falsely elevated results     10/21/2022 0 47 0 20 - 1 00 mg/dL Final     Comment:     Use of this assay is not recommended for patients undergoing treatment with eltrombopag due to the potential for falsely elevated results  10/05/2022 0 70 0 20 - 1 00 mg/dL Final     Comment:     Use of this assay is not recommended for patients undergoing treatment with eltrombopag due to the potential for falsely elevated results  Alkaline Phosphatase   Date Value Ref Range Status   11/28/2022 62 46 - 116 U/L Final   10/21/2022 54 46 - 116 U/L Final   10/05/2022 55 46 - 116 U/L Final     AST   Date Value Ref Range Status   11/28/2022 22 5 - 45 U/L Final     Comment:     Specimen collection should occur prior to Sulfasalazine administration due to the potential for falsely depressed results  10/21/2022 20 5 - 45 U/L Final     Comment:     Specimen collection should occur prior to Sulfasalazine administration due to the potential for falsely depressed results  10/05/2022 19 5 - 45 U/L Final     Comment:     Specimen collection should occur prior to Sulfasalazine administration due to the potential for falsely depressed results  ALT   Date Value Ref Range Status   11/28/2022 31 12 - 78 U/L Final     Comment:     Specimen collection should occur prior to Sulfasalazine and/or Sulfapyridine administration due to the potential for falsely depressed results  10/21/2022 34 12 - 78 U/L Final     Comment:     Specimen collection should occur prior to Sulfasalazine and/or Sulfapyridine administration due to the potential for falsely depressed results  10/05/2022 30 12 - 78 U/L Final     Comment:     Specimen collection should occur prior to Sulfasalazine and/or Sulfapyridine administration due to the potential for falsely depressed results         LD   Date Value Ref Range Status   11/28/2022 262 (H) 81 - 234 U/L Final   10/21/2022 236 (H) 81 - 234 U/L Final   07/30/2022 1,077 (H) 81 - 234 U/L Final     No results found for: TSH  No results found for: M3IEPTH   Free T4   Date Value Ref Range Status   11/28/2022 0 66 (L) 0 76 - 1 46 ng/dL Final     Comment:     Specimen collection should occur prior to Sulfasalazine administration due to the potential for falsely elevated results  RECENT IMAGING:  Procedure: IR drainage tube check with sclerosis    Result Date: 11/29/2022  Narrative: Seroma tube check Clinical History: Fentanyl right inguinal lymph node excisional biopsy, complicated by right inguinal lymphocele  Contrast: 1 mL of iohexol (OMNIPAQUE) Fluoro time: 0 3 Minutes Number of Images: Multiple Radiation dose: 11 mGy Technique: The patient was brought to the interventional radiology suite and placed supine on the table  After a  view was obtained, contrast was injected into the seroma drainage catheter and multiple images were obtained  Findings: Resolution of right inguinal seroma cavity  Impression: Impression: Removal of a drainage catheter  Signed, performed, and dictated by VISHNU Desai under the supervision of Dr Raina Ratliff  Workstation performed: [id number]     Procedure: IR drainage tube check with sclerosis    Result Date: 11/15/2022  Narrative: PROCEDURE: Drainage catheter check with catheter exchange and sclerotherapy Procedural Personnel Attending physician(s): Dr Radha Monsivais Pre-procedure diagnosis: Melanoma Post-procedure diagnosis: Same Indication: Patient with history of right knee melanoma status post wide local excision with sentinel right inguinal lymph node excisional biopsy, complicated by right inguinal lymphocele status post drainage catheter placement 2 months ago returns for repeat drain check  PROCEDURE SUMMARY: - Drainage catheter check under fluoroscopic guidance - Drainage catheter exchange - Right inguinal lymphocele sclerotherapy PROCEDURE DETAILS: Pre-procedure Consent: Existing informed consent was utilized and time-out was performed prior to the procedure  Preparation: The site was prepared and draped using maximal sterile barrier technique including cutaneous antisepsis   Anesthesia/sedation Level of anesthesia/sedation: Local lidocaine Drainage catheter check and exchange Contrast injection through the existing catheter showed persistent lymphocele cavity  Catheter appeared partially retracted  Existing catheter was exchanged for a new 10 [employer] Janice Brown-Cash catheter over a Bentson wire  Catheter was secured to skin using 0 point suture  Sclerotherapy performed using 15 mL Betadine  Contrast Contrast agent: Omnipaque Contrast volume (mL): 10 Radiation Dose Fluoroscopy time (minutes): 1 1  Reference air kerma (mGy): 35 Additional Details Estimated blood loss (mL): None Complications: No immediate complications  Impression: 1  Drainage catheter check showed persistent small lymphocele cavity  2  Existing drainage catheter was partially retracted  Catheter was exchanged for a new 10 [employer]ice Brown-Cash catheter  3  Sclerotherapy performed using 15 mL of Betadine  Plan: - Patient to uncap drainage catheter and connect to bulb suction drainage in 2 hours  - Return in one week for repeat drain check  Workstation performed: [id number]             Assessment/Plan  Mr Minda Smith is a 78 yr male history of hemolytic anemia requiring rituximab and stage IIIc melanoma who began treatment with adjuvant encorafenib and binimetinib 2 weeks ago here for follow-up  1  Malignant melanoma of leg, right ([address] )  Tolerating treatment with encorafenib and binimetinib  Initially had some issues but these have mainly resolved  He is taking antinausea medication as needed  EKG and labs reviewed and okay to continue treatment  Return in 2 weeks for follow-up  He will have another EKG and blood work at that time  All orders placed and prescription provided  He knows to call with any issues or concerns prior to his next visit  - CBC and differential; Future  - Comprehensive metabolic panel; Future  - LD,Blood; Future  - ECG 12 lead; Future    2   High risk medication use  He is on treatment with targeted therapy and we will continue to monitor for side effects and lab abnormalities  We will monitor labs with each visit to ensure safety of continuing on treatment  He knows to watch for signs and symptoms for side effects  Continue to monitor for cardiac toxicity from these medications as well  He will return in 2 weeks with blood work as well as EKG  - CBC and differential; Future  - Comprehensive metabolic panel; Future  - LD,Blood; Future  - ECG 12 lead; Future      Return in about 2 weeks (around 12/14/2022)       Quin Liang MD, PhD [employer]